# Patient Record
Sex: FEMALE | Race: WHITE | Employment: OTHER | URBAN - METROPOLITAN AREA
[De-identification: names, ages, dates, MRNs, and addresses within clinical notes are randomized per-mention and may not be internally consistent; named-entity substitution may affect disease eponyms.]

---

## 2019-08-22 ENCOUNTER — HOSPITAL ENCOUNTER (OUTPATIENT)
Dept: GENERAL RADIOLOGY | Age: 84
Discharge: HOME | End: 2019-08-22
Attending: PHYSICAL MEDICINE & REHABILITATION
Payer: MEDICARE

## 2019-08-22 ENCOUNTER — HOSPITAL ENCOUNTER (OUTPATIENT)
Dept: PHYSICAL THERAPY | Age: 84
Discharge: HOME | End: 2019-08-22
Payer: MEDICARE

## 2019-08-22 DIAGNOSIS — R13.12 OROPHARYNGEAL DYSPHAGIA: ICD-10-CM

## 2019-08-22 PROCEDURE — 92611 MOTION FLUOROSCOPY/SWALLOW: CPT

## 2019-08-22 PROCEDURE — 74011000255 HC RX REV CODE- 255

## 2019-08-22 PROCEDURE — 74230 X-RAY XM SWLNG FUNCJ C+: CPT

## 2019-08-22 RX ADMIN — BARIUM SULFATE 700 MG: 700 TABLET ORAL at 13:38

## 2019-08-22 RX ADMIN — BARIUM SULFATE 70 G: 960 POWDER, FOR SUSPENSION ORAL at 13:39

## 2019-08-22 NOTE — PROGRESS NOTES
[facility] at [place] [address] [address]  Ph: [phone]    Fax: [phone]    Outpatient Modified Barium Swallow Evaluation    Patient: Dora Boss (40 y.o. female)  Date: 8/22/2019  Primary Diagnosis: No admission diagnoses are documented for this encounter. Precautions: Aspiration       Radiologist: HRRA    History: CVA, dysphagia     Videofluoroscopy Results/Recommendations:  Modified Barium Swallow completed with 0 aspiration evident across all study trials, to include: thin liquid +/- straw; pudding; cracker; and 13 mm barium pill with thin liquid wash. Pt with anterior spillage of thin liquids due to right facial weakness, with otherwise (+) bolus cohesion, manipulation, and propulsion; (+) swallow reflex; and (+) hyolaryngeal excursion. x1 occurrence of flash penetration with thin liquid via straw, however all penetrated material was expelled spontaneously from airway. Pt presents with minimal oral dysphagia, as evidenced above. At this time, safe for regular solid, thin liquid diet at discretion of facility SLP. Patient should utilize general safe swallowing guidelines. SLP utilized video of study for visual feedback, education regarding safe swallowing guidelines, and recommendations for patient and daughter; verbalized comprehension. Video Flouroscopic Procedures  [x] Lateral View   [] A-P View [] Scanned to level of Sternum    [x] Seated at 90 deg.   [] Other:    Presentation:    [x] Spoon   [x] Cup   [x] Straw   [] Syringe   [] Consecutive Swallows  [] Other:    Consistencies:   [x] Ba+ liquid   [] Ba+ liquid (nectar)   [] Ba+ liquid (honey)   [x] Ba+ puree [x] Ba+ cookie [x] 13 mm Ba pill with thin Ba wash    Testing Discontinued:   [] Due to:    Treatment Techniques Attempted  [] Head Turn: [] Right [] Left     [] Head Tilt: [] Right [] Left     [] Chin Down:  [x] Small Sips/bites:  [] Effortful swallow:  [] Double swallow:  [] Other:    Results  Dysphagia Present:     [x] Yes  [] No    Ratings of Dysphagia:     [x] Mild  [] Moderate  [] Severe    Stages of Breakdown:   [x] Oral  [] Pharyngeal  [] Esophageal    Aspiration:   [] Yes    [x] No  [] At Risk     Cough: [] Yes      [] No     Penetration:   [x] Yes    [] No     Cough: [] Yes      [x] No   [x] Flash/trace   [] Mod   [] To Chords          Consistency Aspirated:   Consistency Penetrated:   [] Thin Liquid     [x] Thin Liquid  [] Nectar-thick Liquid    [] Nectar-thick Liquid   [] Honey-thick Liquid    [] Honey-thick Liquid   [] Puree     [] Puree  [] Solid     [] Solid  [] 13 mm Ba pill with thin   [] 13 mm Ba pill with thin     Motility Problems with:  [x] Lip Closure: Right sided weakness, facial droop     [] Mastication:   [] Bolus Formation/control:   [] A-P Transport:  [] Tongue Base Retraction:  [] Swallow Response (delayed):  [] Velopharyngeal Closure:  [] Pharyngeal Aspirations:  [] Laryngeal Elevation/adduction:  [] Epiglottic Inversion:  [] Pharyngeal motility/sensation:  [] Cricopharyngeal Relaxation:  [] Esophageal Peristalsis:  [] Other:    Timing of Aspiration/Penetration:  [] Before Swallow:  [x] During Swallow: flash penetration  [] After Swallow:    Transit Time Delay:  [] >1 Second  Oral  [] >1 Second Pharyngeal  [] >20 Second Esophageal     Residuals:  [] Vallecula:    [] Mild  [] Mod  [] Severe  [] Pyriform Sinus:   [] Mild  [] Mod  [] Severe  [] Posterior Pharyngeal Wall:  [] Mild  [] Mod  [] Severe    The severity rating is based on the following outcomes:    National Outcomes Measures (NOMS) - LEV [place] Swallow Level 6  8-point Penetration-Aspiration Scale - Score 2  Professional Judgement    Sarmad Sherwood M.S., CCC-SLP  Speech-Language Pathologist    [facility] [place]